# Patient Record
Sex: FEMALE | Employment: FULL TIME | URBAN - NONMETROPOLITAN AREA
[De-identification: names, ages, dates, MRNs, and addresses within clinical notes are randomized per-mention and may not be internally consistent; named-entity substitution may affect disease eponyms.]

---

## 2025-02-07 PROBLEM — M79.89 LEG SWELLING: Status: ACTIVE | Noted: 2025-02-07

## 2025-02-07 PROBLEM — R87.610 ATYPICAL SQUAMOUS CELL OF UNDETERMINED SIGNIFICANCE OF CERVIX: Status: ACTIVE | Noted: 2025-02-07

## 2025-02-07 PROBLEM — K64.8 BLEEDING INTERNAL HEMORRHOIDS: Status: ACTIVE | Noted: 2025-02-07

## 2025-02-07 PROBLEM — A49.1 INFECTION DUE TO STREPTOCOCCUS AGALACTIAE: Status: ACTIVE | Noted: 2025-02-07

## 2025-02-07 PROBLEM — A59.00 UROGENITAL INFECTION BY TRICHOMONAS VAGINALIS: Status: ACTIVE | Noted: 2025-02-07

## 2025-02-07 PROBLEM — Z98.84 BARIATRIC SURGERY STATUS: Status: ACTIVE | Noted: 2025-02-07

## 2025-02-07 PROBLEM — J45.20 MILD INTERMITTENT ASTHMA WITHOUT COMPLICATION (HHS-HCC): Status: ACTIVE | Noted: 2025-02-07

## 2025-02-07 PROBLEM — O09.30 LATE PRENATAL CARE (HHS-HCC): Status: ACTIVE | Noted: 2025-02-07

## 2025-02-07 PROBLEM — D64.9 ANEMIA: Status: ACTIVE | Noted: 2025-02-07

## 2025-02-07 PROBLEM — Z86.19 HISTORY OF SEXUALLY TRANSMITTED DISEASE: Status: ACTIVE | Noted: 2025-02-07

## 2025-02-07 PROBLEM — K59.00 CONSTIPATION: Status: ACTIVE | Noted: 2025-02-07

## 2025-02-07 PROBLEM — G47.33 OBSTRUCTIVE SLEEP APNEA, ADULT: Status: ACTIVE | Noted: 2025-02-07

## 2025-02-07 PROBLEM — K62.5 RECTAL BLEEDING: Status: ACTIVE | Noted: 2025-02-07

## 2025-02-10 ENCOUNTER — APPOINTMENT (OUTPATIENT)
Dept: PRIMARY CARE | Facility: CLINIC | Age: 40
End: 2025-02-10
Payer: COMMERCIAL

## 2025-02-12 ENCOUNTER — OFFICE VISIT (OUTPATIENT)
Dept: PRIMARY CARE | Facility: CLINIC | Age: 40
End: 2025-02-12
Payer: COMMERCIAL

## 2025-02-12 VITALS
DIASTOLIC BLOOD PRESSURE: 101 MMHG | HEART RATE: 106 BPM | SYSTOLIC BLOOD PRESSURE: 152 MMHG | WEIGHT: 293 LBS | BODY MASS INDEX: 55.32 KG/M2 | HEIGHT: 61 IN | OXYGEN SATURATION: 90 %

## 2025-02-12 DIAGNOSIS — E66.01 MORBID OBESITY WITH BMI OF 60.0-69.9, ADULT (MULTI): ICD-10-CM

## 2025-02-12 DIAGNOSIS — Z13.21 ENCOUNTER FOR VITAMIN DEFICIENCY SCREENING: ICD-10-CM

## 2025-02-12 DIAGNOSIS — M79.89 LEG SWELLING: ICD-10-CM

## 2025-02-12 DIAGNOSIS — Z13.0 SCREENING FOR BLOOD DISEASE: ICD-10-CM

## 2025-02-12 DIAGNOSIS — I15.9 SECONDARY HYPERTENSION: ICD-10-CM

## 2025-02-12 DIAGNOSIS — R63.5 WEIGHT GAIN: Primary | ICD-10-CM

## 2025-02-12 DIAGNOSIS — Z13.220 SCREENING FOR LIPID DISORDERS: ICD-10-CM

## 2025-02-12 DIAGNOSIS — Z13.1 SCREENING FOR DIABETES MELLITUS: ICD-10-CM

## 2025-02-12 DIAGNOSIS — Z13.29 SCREENING FOR THYROID DISORDER: ICD-10-CM

## 2025-02-12 DIAGNOSIS — G47.33 OBSTRUCTIVE SLEEP APNEA, ADULT: ICD-10-CM

## 2025-02-12 DIAGNOSIS — Z13.228 SCREENING FOR METABOLIC DISORDER: ICD-10-CM

## 2025-02-12 DIAGNOSIS — J45.909 MODERATE ASTHMA, UNSPECIFIED WHETHER COMPLICATED, UNSPECIFIED WHETHER PERSISTENT (HHS-HCC): ICD-10-CM

## 2025-02-12 DIAGNOSIS — Z13.89 SCREENING FOR BLOOD OR PROTEIN IN URINE: ICD-10-CM

## 2025-02-12 DIAGNOSIS — J45.20 MILD INTERMITTENT ASTHMA WITHOUT COMPLICATION (HHS-HCC): ICD-10-CM

## 2025-02-12 PROCEDURE — 3008F BODY MASS INDEX DOCD: CPT

## 2025-02-12 PROCEDURE — 1036F TOBACCO NON-USER: CPT

## 2025-02-12 PROCEDURE — 99204 OFFICE O/P NEW MOD 45 MIN: CPT

## 2025-02-12 PROCEDURE — 3077F SYST BP >= 140 MM HG: CPT

## 2025-02-12 PROCEDURE — 3080F DIAST BP >= 90 MM HG: CPT

## 2025-02-12 RX ORDER — ALBUTEROL SULFATE 90 UG/1
2 INHALANT RESPIRATORY (INHALATION) EVERY 4 HOURS PRN
Qty: 8.5 G | Refills: 0 | Status: SHIPPED | OUTPATIENT
Start: 2025-02-12 | End: 2026-02-12

## 2025-02-12 RX ORDER — HYDROCHLOROTHIAZIDE 12.5 MG/1
12.5 TABLET ORAL DAILY
Qty: 30 TABLET | Refills: 0 | Status: SHIPPED | OUTPATIENT
Start: 2025-02-12 | End: 2025-03-14

## 2025-02-12 RX ORDER — BUDESONIDE AND FORMOTEROL FUMARATE DIHYDRATE 160; 4.5 UG/1; UG/1
2 AEROSOL RESPIRATORY (INHALATION)
Qty: 10.2 G | Refills: 11 | Status: SHIPPED | OUTPATIENT
Start: 2025-02-12 | End: 2026-02-12

## 2025-02-12 ASSESSMENT — ENCOUNTER SYMPTOMS
FATIGUE: 1
POLYPHAGIA: 0
POLYDIPSIA: 0
CHEST TIGHTNESS: 1
WEAKNESS: 1
UNEXPECTED WEIGHT CHANGE: 1
BACK PAIN: 1
WHEEZING: 1
ARTHRALGIAS: 1

## 2025-02-12 ASSESSMENT — PAIN SCALES - GENERAL: PAINLEVEL_OUTOF10: 10-WORST PAIN EVER

## 2025-02-12 ASSESSMENT — PATIENT HEALTH QUESTIONNAIRE - PHQ9
SUM OF ALL RESPONSES TO PHQ9 QUESTIONS 1 AND 2: 0
2. FEELING DOWN, DEPRESSED OR HOPELESS: NOT AT ALL
1. LITTLE INTEREST OR PLEASURE IN DOING THINGS: NOT AT ALL

## 2025-02-12 NOTE — ASSESSMENT & PLAN NOTE
-Continue increasing water  -Start cuting out fast food and incorporate protein and vegetables  -Start a food journal  -Will discuss weight loss options at follow up with labs.   -Ready to start making lifestyle changes

## 2025-02-12 NOTE — PROGRESS NOTES
"Subjective     Patient ID: Leann Petersen \"Charmaine\" is a 39 y.o. female who presents for Weight Loss.    HPI    Charmaine is here to establish care. She is tearful upon entering the room.     Weight gain/Obesity BMI 68.7- Eats once or twice a day. She started food modifications about 2 weeks ago. She was drinking a lot of soda, she is not drinking water. She works at [employer] and [employer] and this is usually where she gets her meals from.  She has 5 children, her  baseline was 160 lb.     ADÁN-Can't sleep, wakes up with headaches, nods off during the day. She has issues with her c-pap feels like she can't breath with it on. SOB while talking, SpO2 noted to be 90%    Hx of asthma- she is not using an inhaler, she is short of breath with talking, she states she gets short of breath when doing small task, she gets chest pain and tightness like someone is squeezing her.     Low back pain- Has severe low back pain in the morning, has a hard time being on her feet all day.     Flat arches- Following with podiatry, they want to reconstruct feet.     IUD- Mirana, she is not sexually active at this time.     Review of Systems   Constitutional:  Positive for fatigue and unexpected weight change.   Respiratory:  Positive for chest tightness and wheezing.    Cardiovascular:  Positive for leg swelling.   Endocrine: Negative for polydipsia, polyphagia and polyuria.   Musculoskeletal:  Positive for arthralgias, back pain and gait problem.   Neurological:  Positive for weakness.       Social history:    reports that she has never smoked. She has never used smokeless tobacco. She reports that she does not drink alcohol and does not use drugs.    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    Pes valgus of left foot    Other congenital valgus deformities of feet " "2017    Pes valgus of right foot    Other specified health status     Last menstrual period (LMP) > 10 days ago    Pain in left knee 2018    Left knee pain    Personal history of other diseases of urinary system     Personal history of prenatal hydronephrosis    Personal history of other infectious and parasitic diseases     History of gonorrhea    Personal history of other specified conditions     History of abnormal Pap smear    Streptococcal infection, unspecified site     GBS (group B streptococcus) infection    Supervision of other high risk pregnancies, unspecified trimester ([facility])     Short interval between pregnancies complicating pregnancy, antepartum    Supervision of pregnancy with insufficient  care, unspecified trimester     Late prenatal care    Type A blood, Rh positive     Blood type A+    Urogenital trichomoniasis, unspecified     Trichs - trichomonas vaginalis infection      SurgHx:   History reviewed. No pertinent surgical history.     Fhx:   Mother- HTN, DM2  Maternal grandmother-HTN, DM2    Medications:   none    Allergies:   No Known Allergies     Review of systems completed and unremarkable other than what is documented in HPI.    Objective   BP (!) 152/101 (BP Location: Right arm)   Pulse 106   Ht 1.549 m (5' 1\")   Wt (!) 165 kg (363 lb 11.2 oz)   SpO2 90%   BMI 68.72 kg/m²     Physical Exam  Vitals reviewed.   Constitutional:       Appearance: She is obese.   HENT:      Head: Normocephalic.      Right Ear: Tympanic membrane, ear canal and external ear normal.      Left Ear: Tympanic membrane, ear canal and external ear normal.      Nose: Nose normal.      Mouth/Throat:      Mouth: Mucous membranes are moist.   Eyes:      Conjunctiva/sclera: Conjunctivae normal.      Pupils: Pupils are equal, round, and reactive to light.   Cardiovascular:      Rate and Rhythm: Normal rate and regular rhythm.      Pulses: Normal pulses.      Heart sounds: Normal heart sounds. "   Pulmonary:      Effort: Pulmonary effort is normal.      Breath sounds: Wheezing present.   Abdominal:      General: Bowel sounds are normal.   Musculoskeletal:         General: Swelling present.      Cervical back: Normal range of motion.      Right lower le+ Pitting Edema present.      Left lower le+ Pitting Edema present.   Skin:     General: Skin is warm.      Capillary Refill: Capillary refill takes more than 3 seconds.   Neurological:      Mental Status: She is alert and oriented to person, place, and time.   Psychiatric:         Mood and Affect: Mood normal.         Behavior: Behavior normal.         Assessment & Plan  Screening for diabetes mellitus  BMI 68.7  Orders:    Hemoglobin A1c; Future    Encounter for vitamin deficiency screening  Orders:    Vitamin D 25-Hydroxy,Total (for eval of Vitamin D levels); Future    Vitamin B12; Future    Screening for blood or protein in urine  BMI 68.7  Orders:    Urinalysis with Reflex Microscopic; Future    Screening for lipid disorders  BMI 68.7  Orders:    Lipid panel; Future    Screening for thyroid disorder  Hx of abnormal TSH levels  Orders:    Tsh With Reflex To Free T4 If Abnormal; Future    T3; Future    Thyroid Peroxidase (TPO) Antibody; Future    Screening for metabolic disorder  Orders:    Comprehensive metabolic panel; Future    Screening for blood disease  Orders:    CBC and Auto Differential; Future    Weight gain  Orders:    T3; Future    Thyroid Peroxidase (TPO) Antibody; Future  Expresses wanting to lose weight  Obstructive sleep apnea, adult  Wear CPAP  Referral back to sleep medicine to help adjust CPAP  Weight loss    Secondary hypertension  BLE edema  -Start hydrochlorothiazide  -Follow up 6 weeks for bp check    Moderate asthma, unspecified whether complicated, unspecified whether persistent ([facility])  Start albuterol and symbicort  Discussed weight loss    Morbid obesity with BMI of 60.0-69.9, adult (Multi)  -Continue increasing  water  -Start cuting out fast food and incorporate protein and vegetables  -Start a food journal  -Will discuss weight loss options at follow up with labs.   -Ready to start making lifestyle changes         #HCM  DM, Thyroid, Lipid Screening- ordered  Pap- 2019 due  Follow up 6 weeks for BP and weight check

## 2025-02-13 DIAGNOSIS — E55.9 VITAMIN D DEFICIENCY: Primary | ICD-10-CM

## 2025-02-13 LAB
25(OH)D3+25(OH)D2 SERPL-MCNC: 9 NG/ML (ref 30–100)
ALBUMIN SERPL-MCNC: 4 G/DL (ref 3.6–5.1)
ALP SERPL-CCNC: 74 U/L (ref 31–125)
ALT SERPL-CCNC: 21 U/L (ref 6–29)
ANION GAP SERPL CALCULATED.4IONS-SCNC: 6 MMOL/L (CALC) (ref 7–17)
AST SERPL-CCNC: 13 U/L (ref 10–30)
BASOPHILS # BLD AUTO: 22 CELLS/UL (ref 0–200)
BASOPHILS NFR BLD AUTO: 0.4 %
BILIRUB SERPL-MCNC: 0.4 MG/DL (ref 0.2–1.2)
BUN SERPL-MCNC: 10 MG/DL (ref 7–25)
CALCIUM SERPL-MCNC: 9.1 MG/DL (ref 8.6–10.2)
CHLORIDE SERPL-SCNC: 99 MMOL/L (ref 98–110)
CHOLEST SERPL-MCNC: 138 MG/DL
CHOLEST/HDLC SERPL: 2.3 (CALC)
CO2 SERPL-SCNC: 36 MMOL/L (ref 20–32)
CREAT SERPL-MCNC: 0.55 MG/DL (ref 0.5–0.97)
EGFRCR SERPLBLD CKD-EPI 2021: 121 ML/MIN/1.73M2
EOSINOPHIL # BLD AUTO: 28 CELLS/UL (ref 15–500)
EOSINOPHIL NFR BLD AUTO: 0.5 %
ERYTHROCYTE [DISTWIDTH] IN BLOOD BY AUTOMATED COUNT: 13.3 % (ref 11–15)
EST. AVERAGE GLUCOSE BLD GHB EST-MCNC: 134 MG/DL
EST. AVERAGE GLUCOSE BLD GHB EST-SCNC: 7.4 MMOL/L
GLUCOSE SERPL-MCNC: 107 MG/DL (ref 65–99)
HBA1C MFR BLD: 6.3 % OF TOTAL HGB
HCT VFR BLD AUTO: 45.5 % (ref 35–45)
HDLC SERPL-MCNC: 61 MG/DL
HGB BLD-MCNC: 14.2 G/DL (ref 11.7–15.5)
LDLC SERPL CALC-MCNC: 64 MG/DL (CALC)
LYMPHOCYTES # BLD AUTO: 2145 CELLS/UL (ref 850–3900)
LYMPHOCYTES NFR BLD AUTO: 38.3 %
MCH RBC QN AUTO: 29.2 PG (ref 27–33)
MCHC RBC AUTO-ENTMCNC: 31.2 G/DL (ref 32–36)
MCV RBC AUTO: 93.6 FL (ref 80–100)
MONOCYTES # BLD AUTO: 448 CELLS/UL (ref 200–950)
MONOCYTES NFR BLD AUTO: 8 %
NEUTROPHILS # BLD AUTO: 2957 CELLS/UL (ref 1500–7800)
NEUTROPHILS NFR BLD AUTO: 52.8 %
NONHDLC SERPL-MCNC: 77 MG/DL (CALC)
PLATELET # BLD AUTO: 235 THOUSAND/UL (ref 140–400)
PMV BLD REES-ECKER: 12.2 FL (ref 7.5–12.5)
POTASSIUM SERPL-SCNC: 4.6 MMOL/L (ref 3.5–5.3)
PROT SERPL-MCNC: 7 G/DL (ref 6.1–8.1)
RBC # BLD AUTO: 4.86 MILLION/UL (ref 3.8–5.1)
SODIUM SERPL-SCNC: 141 MMOL/L (ref 135–146)
T3 SERPL-MCNC: 123 NG/DL (ref 76–181)
THYROPEROXIDASE AB SERPL-ACNC: <1 IU/ML
TRIGL SERPL-MCNC: 45 MG/DL
TSH SERPL-ACNC: 0.41 MIU/L
VIT B12 SERPL-MCNC: 404 PG/ML (ref 200–1100)
WBC # BLD AUTO: 5.6 THOUSAND/UL (ref 3.8–10.8)

## 2025-02-13 RX ORDER — ERGOCALCIFEROL 1.25 MG/1
50000 CAPSULE ORAL WEEKLY
Qty: 12 CAPSULE | Refills: 0 | Status: SHIPPED | OUTPATIENT
Start: 2025-02-13 | End: 2025-05-08

## 2025-02-13 NOTE — RESULT ENCOUNTER NOTE
Please call the patient regarding her abnormal result. Sent vitamin D to pharm [phone] once weekly

## 2025-02-20 ENCOUNTER — HOSPITAL ENCOUNTER (OUTPATIENT)
Dept: RADIOLOGY | Facility: CLINIC | Age: 40
Discharge: HOME | End: 2025-02-20
Payer: COMMERCIAL

## 2025-02-20 ENCOUNTER — APPOINTMENT (OUTPATIENT)
Dept: ORTHOPEDIC SURGERY | Facility: CLINIC | Age: 40
End: 2025-02-20
Payer: COMMERCIAL

## 2025-02-20 DIAGNOSIS — M25.561 PAIN IN BOTH KNEES, UNSPECIFIED CHRONICITY: ICD-10-CM

## 2025-02-20 DIAGNOSIS — M25.562 PAIN IN BOTH KNEES, UNSPECIFIED CHRONICITY: ICD-10-CM

## 2025-02-20 PROCEDURE — 99204 OFFICE O/P NEW MOD 45 MIN: CPT | Performed by: ORTHOPAEDIC SURGERY

## 2025-02-20 PROCEDURE — 73564 X-RAY EXAM KNEE 4 OR MORE: CPT | Mod: 50

## 2025-02-20 PROCEDURE — 1036F TOBACCO NON-USER: CPT | Performed by: ORTHOPAEDIC SURGERY

## 2025-02-20 RX ORDER — MELOXICAM 15 MG/1
15 TABLET ORAL DAILY
Qty: 60 TABLET | Refills: 0 | Status: SHIPPED | OUTPATIENT
Start: 2025-02-20 | End: 2025-04-21

## 2025-02-20 ASSESSMENT — PAIN SCALES - GENERAL: PAINLEVEL_OUTOF10: 10 - WORST POSSIBLE PAIN

## 2025-02-20 ASSESSMENT — PAIN - FUNCTIONAL ASSESSMENT: PAIN_FUNCTIONAL_ASSESSMENT: 0-10

## 2025-02-20 NOTE — LETTER
February 20, 2025     TONI Medrano  [address]  [place] [address]    Patient: Charmaine Petersen   YOB: 1985   Date of Visit: 2/20/2025       Dear TONI Jorgensen:    Thank you for referring Charmaine Petersen to me for evaluation. Below are my notes for this consultation.  If you have questions, please do not hesitate to call me. I look forward to following your patient along with you.       Sincerely,     Sarath Jefferson MD      CC: No Recipients  ______________________________________________________________________________________    This is a consultation from TONI Jorgensen for   Chief Complaint   Patient presents with   • Left Knee - Pain   • Right Knee - Pain       This is a 39 y.o. female who presents for evaluation of bilateral knee pain.  Patient states she has had issues with her bilateral knees for years, this is a longstanding chronic issue for her.  It has been exacerbated recently.  She complains of a sharp stabbing pain over the medial and anterior knee worse with any type of walking.  She does not really take medications.  She has tried cortisone shots in the past that have not helped very much.  She has never had surgery on either knee.  No numbness no tingling no pain down the leg.    Physical Exam    There has been no interval change in this patient's past medical, surgical, medications, allergies, family history or social history since the most recent visit to a provider within our department. 14 point review of systems was performed, reviewed, and negative except for pertinent positives documented in the history of present illness.     Constitutional: well developed, well nourished female in no acute distress  Psychiatric: normal mood, appropriate affect  Eyes: sclera anicteric  HENT: normocephalic/atraumatic  CV: regular rate and rhythm   Respiratory: non labored breathing  Integumentary: no rash  Neurological: moves all  extremities    Bilateral knee exam: skin intact no lacerations or abrations. no effusion.  Tender medial joint line. negative log roll negative patellar grind. ROM 0-120. stable to varus and valgus stress at 0 and 30 degrees. negative lachman negative posterior drawer negative everardo. 5/5 ehl/fhl/gs/ta. silt s/s/sp/dp/t. 2+ dp/pt        Xrays were ordered by me, they were reviewed and independently interpreted by me today, they show moderate degenerative changes bilateral knees    Procedures      Impression/Plan: This is a 39 y.o. female with moderate bilateral knee arthritis.  I had an in depth discussion with the patient regarding treatment options for arthritis and their relative risks and benefits. We reviewed surgical and nonsurgical option for treatment. Treatments include anti inflammatory medications, physical therapy, weight loss, activity modification, use of assistive devices, injection therapies. We discussed current prescriptions and risks and benefits of continuation of prescription medication as apporpriate. We discussed that arthritis is often progressive over time, an in end stage arthritis surgical interventions can be considered, including arthroplasty. All questions were answered and the patient voiced their understanding.  Will get her set up with an anti-inflammatory physical therapy.  We discussed the possibility of gel injections, we will consider that in the future.  We discussed we discussed weight loss, she is working on that.  I will see her back to continue caring for her arthritis.    BMI Readings from Last 1 Encounters:   02/12/25 68.72 kg/m²      Lab Results   Component Value Date    CREATININE 0.55 02/12/2025     Tobacco Use: Low Risk  (2/20/2025)    Patient History    • Smoking Tobacco Use: Never    • Smokeless Tobacco Use: Never    • Passive Exposure: Not on file      Computed MELD 3.0 unavailable. One or more values for this score either were not found within the given timeframe  "or did not fit some other criterion.  Computed MELD-Na unavailable. One or more values for this score either were not found within the given timeframe or did not fit some other criterion.       Lab Results   Component Value Date    HGBA1C 6.3 (H) 02/12/2025     No results found for: \"STAPHMRSASCR\"  "

## 2025-02-20 NOTE — PROGRESS NOTES
This is a consultation from ZEYAD Jorgensen-CNP for   Chief Complaint   Patient presents with    Left Knee - Pain    Right Knee - Pain       This is a 39 y.o. female who presents for evaluation of bilateral knee pain.  Patient states she has had issues with her bilateral knees for years, this is a longstanding chronic issue for her.  It has been exacerbated recently.  She complains of a sharp stabbing pain over the medial and anterior knee worse with any type of walking.  She does not really take medications.  She has tried cortisone shots in the past that have not helped very much.  She has never had surgery on either knee.  No numbness no tingling no pain down the leg.    Physical Exam    There has been no interval change in this patient's past medical, surgical, medications, allergies, family history or social history since the most recent visit to a provider within our department. 14 point review of systems was performed, reviewed, and negative except for pertinent positives documented in the history of present illness.     Constitutional: well developed, well nourished female in no acute distress  Psychiatric: normal mood, appropriate affect  Eyes: sclera anicteric  HENT: normocephalic/atraumatic  CV: regular rate and rhythm   Respiratory: non labored breathing  Integumentary: no rash  Neurological: moves all extremities    Bilateral knee exam: skin intact no lacerations or abrations. no effusion.  Tender medial joint line. negative log roll negative patellar grind. ROM 0-120. stable to varus and valgus stress at 0 and 30 degrees. negative lachman negative posterior drawer negative everardo. 5/5 ehl/fhl/gs/ta. silt s/s/sp/dp/t. 2+ dp/pt        Xrays were ordered by me, they were reviewed and independently interpreted by me today, they show moderate degenerative changes bilateral knees    Procedures      Impression/Plan: This is a 39 y.o. female with moderate bilateral knee arthritis.  I had an in  "depth discussion with the patient regarding treatment options for arthritis and their relative risks and benefits. We reviewed surgical and nonsurgical option for treatment. Treatments include anti inflammatory medications, physical therapy, weight loss, activity modification, use of assistive devices, injection therapies. We discussed current prescriptions and risks and benefits of continuation of prescription medication as apporpriate. We discussed that arthritis is often progressive over time, an in end stage arthritis surgical interventions can be considered, including arthroplasty. All questions were answered and the patient voiced their understanding.  Will get her set up with an anti-inflammatory physical therapy.  We discussed the possibility of gel injections, we will consider that in the future.  We discussed we discussed weight loss, she is working on that.  I will see her back to continue caring for her arthritis.    BMI Readings from Last 1 Encounters:   02/12/25 68.72 kg/m²      Lab Results   Component Value Date    CREATININE 0.55 02/12/2025     Tobacco Use: Low Risk  (2/20/2025)    Patient History     Smoking Tobacco Use: Never     Smokeless Tobacco Use: Never     Passive Exposure: Not on file      Computed MELD 3.0 unavailable. One or more values for this score either were not found within the given timeframe or did not fit some other criterion.  Computed MELD-Na unavailable. One or more values for this score either were not found within the given timeframe or did not fit some other criterion.       Lab Results   Component Value Date    HGBA1C 6.3 (H) 02/12/2025     No results found for: \"STAPHMRSASCR\"  "

## 2025-03-10 ENCOUNTER — DOCUMENTATION (OUTPATIENT)
Dept: PHYSICAL THERAPY | Facility: HOSPITAL | Age: 40
End: 2025-03-10
Payer: COMMERCIAL

## 2025-03-10 ENCOUNTER — APPOINTMENT (OUTPATIENT)
Dept: PHYSICAL THERAPY | Facility: HOSPITAL | Age: 40
End: 2025-03-10
Payer: COMMERCIAL

## 2025-03-10 NOTE — PROGRESS NOTES
"Physical Therapy                 Therapy Communication Note    Patient Name: Leann Petersen \"Charmaine\"  MRN: [id number]  Today's Date: 3/10/2025     Discipline: Physical Therapy    Missed Time: Canceled today's appointment. No reason given.    "

## 2025-03-14 ENCOUNTER — DOCUMENTATION (OUTPATIENT)
Dept: PHYSICAL THERAPY | Facility: HOSPITAL | Age: 40
End: 2025-03-14
Payer: COMMERCIAL

## 2025-03-14 ENCOUNTER — APPOINTMENT (OUTPATIENT)
Dept: PHYSICAL THERAPY | Facility: HOSPITAL | Age: 40
End: 2025-03-14
Payer: COMMERCIAL

## 2025-03-14 NOTE — PROGRESS NOTES
"Physical Therapy                 Therapy Communication Note    Patient Name: Leann Petersen \"Charmaine\"  MRN: [id number]  Today's Date: 3/14/2025     Discipline: Physical Therapy    Missed Time: Cancel today's visit. Patient rescheduled for next week.    "

## 2025-03-21 ENCOUNTER — EVALUATION (OUTPATIENT)
Dept: PHYSICAL THERAPY | Facility: HOSPITAL | Age: 40
End: 2025-03-21
Payer: COMMERCIAL

## 2025-03-21 DIAGNOSIS — M25.562 PAIN IN BOTH KNEES, UNSPECIFIED CHRONICITY: Primary | ICD-10-CM

## 2025-03-21 DIAGNOSIS — M25.561 PAIN IN BOTH KNEES, UNSPECIFIED CHRONICITY: Primary | ICD-10-CM

## 2025-03-21 PROCEDURE — 97110 THERAPEUTIC EXERCISES: CPT | Mod: GP | Performed by: PHYSICAL THERAPIST

## 2025-03-21 PROCEDURE — 97162 PT EVAL MOD COMPLEX 30 MIN: CPT | Mod: GP | Performed by: PHYSICAL THERAPIST

## 2025-03-21 ASSESSMENT — PAIN - FUNCTIONAL ASSESSMENT: PAIN_FUNCTIONAL_ASSESSMENT: 0-10

## 2025-03-21 ASSESSMENT — PAIN SCALES - GENERAL: PAINLEVEL_OUTOF10: 10 - WORST POSSIBLE PAIN

## 2025-03-21 ASSESSMENT — ENCOUNTER SYMPTOMS
DEPRESSION: 0
LOSS OF SENSATION IN FEET: 0
OCCASIONAL FEELINGS OF UNSTEADINESS: 0

## 2025-03-21 NOTE — PROGRESS NOTES
"  Physical Therapy  Physical Therapy Evaluation and Treatment    Patient Name: Leann Petersen \"Jairo"  MRN: [id number]  Today's Date: 3/21/2025  Time Calculation  Start Time: 0936  Stop Time: 1015  Time Calculation (min): 39 min    Today's Charges  PT Evaluation Time Entry  PT Evaluation (Moderate) Time Entry: 29  PT Therapeutic Procedures Time Entry  Therapeutic Exercise Time Entry: 10     Insurance:  Visit number: 1 of 5  Visit Limit: MN  Co-Pay: $0  Authorization info: no auth required  Payor: [employer] / Plan: [employer] / Product Type: *No Product type* /     Current Problem  1. Pain in both knees, unspecified chronicity  Referral to Physical Therapy    Follow Up In Physical Therapy        Problem List Items Addressed This Visit             ICD-10-CM    Pain in both knees - Primary M25.561, M25.562    Relevant Orders    Follow Up In Physical Therapy       General:  General  Reason for Referral: bilat knee pain  Referred By: Dr. Jefferson    Precautions:   Precautions  STEADI Fall Risk Score (The score of 4 or more indicates an increased risk of falling): 3  Medical Precautions: No known precautions/limitation    Medical History Form: Reviewed (scanned into chart)    Subjective:   Subjective   Chief Complaint: Patient is a 39 year old female who presents to clinic with complaints of bilateral knee pain. Patient has had issues with bilateral knee pain for 2-3 years and her pain has become more constant over the past year. Patient has a prior medical history including: morbid obesity, anemia, flat feet, peroneal tendon injury, ankle sprain, bilateral knee pain, moderate asthma. Patient states that she has shoe inserts for her flat feet, but she does not wear them.  Onset Date: 2/20/2025  SWATHI: Chronic    Current Condition:   Same    Pain:  Pain Assessment: 0-10  0-10 (Numeric) Pain Score: 10 - Worst possible pain  Pain Type: Chronic pain  Pain Location: Knee  Pain Orientation: Right, Left  Highest: \"20\"/10 " pain  Lowest: 5/10 pain  Aggravating Factors:  Standing and Walking  Relieving Factors:  Rest and Sitting    Relevant Information (PMH & Previous Tests/Imaging):   Bilateral knee xrays 2/20/2025:  IMPRESSION:  Advanced osteoarthritis left knee worst medially.  Mild-to-moderate osteoarthritis right knee worst medially.    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 No  Fever/chills, unexplained weight changes, dizziness/fainting, unexplained change in bowel or bladder functions, unexplained malaise or muscle weakness, night pain/sweats, numbness or tingling    Objective:  Objective     Hip PROM  R hip flexion: (125°): mod impairment  L hip flexion: (125°): mod impairment  R hip abduction: (45°): WFL  L hip abduction: (45°): WFL  R hip ER: (45°): WFL  L hip ER: (45°): WFL  R hip IR: (45°): min impairment  L hip IR: (45°): mod impairment    Specific Lower Extremity MMT  R Iliopsoas: (5/5): at least 3+/5  L Iliopsoas: (5/5): at least 3+/5  R Gluteals (sidelying): (5/5): functionally observed at least 3/5  L Gluteals (sidelying): (5/5): functionally observed at least 3/5    Flexibility  R hamstrings: min impaired  L hamstrings: min impaired    Functional Rating Scale  LEFS /80: 5    Knee AROM  R knee flexion: (140°): 0-95  L knee flexion: (140°): 0-10-70    Knee MMT  R knee flexion: (5/5): 4+/5  L knee flexion: (5/5): 4+/5  R knee extension: (5/5): 4/5 (pain)  L knee extension: (5/5): 4/5 (pain)        Functional Screening    Posture: flexed posture    Lower Extremity Functional Movements  Transfers: Independent  Gait:   antalgic, Trendelenburg pattern  Assistive Device: no device  DL Squat: requires use of bilateral upper extremities to assist  SL Squat: not tested   Balance  Not tested     Palpation: point tenderness to palpation of medial joint line of left knee    Joint Mobility: hypomobile    Flexibility: impaired left hip and hamstring flexibility      Special Tests    Ligament Tests:              Anterior Drawer Test: -   Posterior Drawer Test: -   Valgus Stress Test: -   Varus Stress Test: -  Meniscus   Nando Test: -     Patella   Grind Test: -    Outcome Measures:  Other Measures  Lower Extremity Funtional Score (LEFS): 5/80     Treatment Performed:  Therapeutic Exercise  Therapeutic Exercise Performed: Yes  Therapeutic Exercise Activity 1: supine heel slides arom x10  Therapeutic Exercise Activity 2: QS with towel roll under knee x5  Therapeutic Exercise Activity 3: GS x5  Therapeutic Exercise Activity 4: supine hip abd x5              EDUCATION:   Individual(s) Educated: patient   Education Provided: Home exercise program, plan of care, activity modifications, pain management, and injury pathology  Handout(s) Provided: Scanned into chart  Home Program: Access Code: [email or web address]  URL: [email or web address]  Date: 03/21/2025  Prepared by: Jr Frost    Exercises  - Seated Heel Slide  - 1-2 x daily - 10 reps  - Supine Quad Set on Towel Roll  - 1-2 x daily - 10 reps  - Supine Gluteal Sets  - 1-2 x daily - 10 reps  - Supine Hip Abduction  - 1-2 x daily - 10 reps  Risk and Benefits Discussed with Patient/Caregiver/Other: Yes   Patient/Caregiver Demonstrated Understanding: Yes   Plan of Care Discussed and Agreed Upon: Yes   Patient Response to Education: Patient/Caregiver verbalized understanding of information, Patient/Caregiver performed return demonstration of exercises/activities, and Patient/Caregiver asked appropriate questions    Assessment: Patient presents with impaired strength, range of  motion/flexibility, gait, and mobility resulting in limited participation in pain-free ADLs and inability to perform at their prior level of function. Patient demonstrated difficulty fully extending her left knee and demonstrated a tendency to keep her left hip in external rotation to decrease her left knee pain. Patient demonstrated pain with knee range of motion and quad sets requiring a larger towel roll under her knee for comfort. Patient demonstrated difficulty performing quad set with verbal cues and tactile cueing. Skilled PT warranted to address the above stated impairments, so the patient can perform FA's without increased pain or difficulty.    PT Assessment Results: Decreased strength, Decreased range of motion, Decreased mobility, Obesity, Pain  Rehab Prognosis: Fair  Evaluation/Treatment Tolerance: Patient limited by pain, Patient tolerated treatment well    Complexity: moderate    Plan:  Treatment/Interventions: Education/ Instruction, Gait training, Manual therapy, Neuromuscular re-education, Therapeutic activities, Therapeutic exercises  PT Plan: Skilled PT  PT Frequency: 1 time per week  Duration: 5 weeks  Onset Date: 02/20/25  Certification Period Start Date: 03/21/25  Certification Period End Date: 04/25/25  Number of Treatments Authorized: 1 of 5 (30/yr)  Rehab Potential: Fair  Plan of Care Agreement: Patient    Goals: Set and discussed today  Active       PT Problem       Patient will work a full shift without pain.        Start:  03/21/25    Expected End:  04/25/25            Patient will ambulate household distance using no device with independent without increased knee pain.       Start:  03/21/25    Expected End:  04/11/25            Patient will ambulate up and down a curb/single step with modified independent using rail.       Start:  03/21/25    Expected End:  04/25/25            Patient will achieve bilateral knee ROM of  0-110 degrees        Start:  03/21/25    Expected End:  04/25/25             Patient will achieve bilateral hip flexion strength of at least 4+/5       Start:  03/21/25    Expected End:  04/25/25            Patient will achieve bilateral hip abduction strength of at least 4+/5       Start:  03/21/25    Expected End:  04/25/25            Patient will achieve bilateral knee flexion strength of at least 4+/5       Start:  03/21/25    Expected End:  04/25/25            Patient will achieve bilateral knee extension strength of at least 4+/5       Start:  03/21/25    Expected End:  04/25/25            Patient will demonstrate independence in home program for support of progression       Start:  03/21/25    Expected End:  04/25/25            Patient will report pain of no more than 2/10 demonstrating a reduction of overall pain       Start:  03/21/25    Expected End:  04/25/25            Patient will show a significant change in LEFS (5 to 14) patient reported outcome tool to demonstrate subjective imporovement       Start:  03/21/25    Expected End:  04/25/25                Plan of care was developed with input and agreement by the patient    Ambulatory Screenings Summary       Screening  Frequency  Date Last Completed   Spiritual and Cultural Beliefs   Screening each visit or episode of care 3/21/2025   Falls Risk Screening every ambulatory visit 3/21/2025  9:53 AM   Pain Screening annually at primary care visit  2/12/2025   Domestic Violence screening annually at primary care visit 3/21/2025   Depression Screening annually in the primary care setting 2/12/2025   Suicide Risk Screening annually in the primary care setting    Nutrition and Food Insecurity   Screening at least annually at primary care visit     Key Learner annually in the primary care setting 3/21/2025   Drug Screen  2/20/2025 10:22 AM   Alcohol Screen  2/20/2025 10:22 AM   Advance Directive         Jr Frost, PT

## 2025-03-24 DIAGNOSIS — I15.9 SECONDARY HYPERTENSION: ICD-10-CM

## 2025-03-24 DIAGNOSIS — M79.89 LEG SWELLING: ICD-10-CM

## 2025-03-24 RX ORDER — HYDROCHLOROTHIAZIDE 12.5 MG/1
TABLET ORAL
Qty: 30 TABLET | Refills: 0 | Status: SHIPPED | OUTPATIENT
Start: 2025-03-24

## 2025-03-31 ENCOUNTER — TREATMENT (OUTPATIENT)
Dept: PHYSICAL THERAPY | Facility: HOSPITAL | Age: 40
End: 2025-03-31
Payer: COMMERCIAL

## 2025-03-31 DIAGNOSIS — M25.561 PAIN IN BOTH KNEES, UNSPECIFIED CHRONICITY: ICD-10-CM

## 2025-03-31 DIAGNOSIS — M25.562 PAIN IN BOTH KNEES, UNSPECIFIED CHRONICITY: ICD-10-CM

## 2025-03-31 PROCEDURE — 97110 THERAPEUTIC EXERCISES: CPT | Mod: GP,CQ

## 2025-03-31 ASSESSMENT — PAIN - FUNCTIONAL ASSESSMENT: PAIN_FUNCTIONAL_ASSESSMENT: 0-10

## 2025-03-31 ASSESSMENT — PAIN SCALES - GENERAL: PAINLEVEL_OUTOF10: 10 - WORST POSSIBLE PAIN

## 2025-03-31 NOTE — PROGRESS NOTES
"  Physical Therapy Treatment    Patient Name: Leann Petersen  MRN: [id number]  Today's Date: 3/31/2025  Time Calculation  Start Time: 1003  Stop Time: 1041  Time Calculation (min): 38 min        PT Therapeutic Procedures Time Entry  Therapeutic Exercise Time Entry: 38                Current Problem  1. Pain in both knees, unspecified chronicity  Follow Up In Physical Therapy          General  Reason for Referral: bilat knee pain  Referred By: Dr. Jefferson    Subjective   Current Condition:   Same  Patient reports she hasn't been taking the Meloxicam, as she didn't notice any improvement when she took them.     Performing HEP?: Yes    Precautions  Precautions  Medical Precautions: No known precautions/limitation  Pain  Pain Assessment: 0-10  0-10 (Numeric) Pain Score: 10 - Worst possible pain  Pain Location: Knee  Pain Orientation: Right, Left    Objective      Knee AROM       Treatments:    Therapeutic Exercise  Therapeutic Exercise Performed: Yes  Therapeutic Exercise Activity 1: supine heel slides arom x10  Therapeutic Exercise Activity 2: QS with towel roll under knee x5  Therapeutic Exercise Activity 3: LAQ x10  Therapeutic Exercise Activity 4: supine hip abd x15  Therapeutic Exercise Activity 5: SAQ x10  Therapeutic Exercise Activity 6: Iso Hip ADD x10 3\"  Therapeutic Exercise Activity 7: Ankle PF Blue x20  Therapeutic Exercise Activity 8: Hamstring Red x20 R/L  Therapeutic Exercise Activity 9: Standing march x 8-10 R              EDUCATION:   Individual(s) Educated: Patient  Education Provided: HEP   Risk and Benefits Discussed with Patient/Caregiver/Other: Yes   Patient/Caregiver Demonstrated Understanding: Yes   Patient Response to Education: Patient/Caregiver verbalized understanding of information    Assessment: Patient was able to perform OKC strengthening without c/o increased pain. Patient not able to perform standing march with the R d/t increased pain WB on the L, so held for today.   PT Assessment  PT " Assessment Results: Decreased strength, Decreased range of motion, Decreased mobility, Obesity, Pain  Rehab Prognosis: Fair    Plan: Continue to progress current POC as tolerated to facilitate ability to perform functional activities.   OP PT Plan  Treatment/Interventions: Education/ Instruction, Gait training, Manual therapy, Neuromuscular re-education, Therapeutic activities, Therapeutic exercises  PT Plan: Skilled PT  PT Frequency: 1 time per week  Duration: 5 weeks  Onset Date: 02/20/25  Certification Period Start Date: 03/21/25  Certification Period End Date: 04/25/25  Number of Treatments Authorized: 2 of 5 (30/yr)  Rehab Potential: Fair  Plan of Care Agreement: Patient    Goals:  Active       PT Problem       Patient will work a full shift without pain.        Start:  03/21/25    Expected End:  04/25/25            Patient will ambulate household distance using no device with independent without increased knee pain.       Start:  03/21/25    Expected End:  04/11/25            Patient will ambulate up and down a curb/single step with modified independent using rail.       Start:  03/21/25    Expected End:  04/25/25            Patient will achieve bilateral knee ROM of  0-110 degrees        Start:  03/21/25    Expected End:  04/25/25            Patient will achieve bilateral hip flexion strength of at least 4+/5       Start:  03/21/25    Expected End:  04/25/25            Patient will achieve bilateral hip abduction strength of at least 4+/5       Start:  03/21/25    Expected End:  04/25/25            Patient will achieve bilateral knee flexion strength of at least 4+/5       Start:  03/21/25    Expected End:  04/25/25            Patient will achieve bilateral knee extension strength of at least 4+/5       Start:  03/21/25    Expected End:  04/25/25            Patient will demonstrate independence in home program for support of progression       Start:  03/21/25    Expected End:  04/25/25            Patient will  report pain of no more than 2/10 demonstrating a reduction of overall pain       Start:  03/21/25    Expected End:  04/25/25            Patient will show a significant change in LEFS (5 to 14) patient reported outcome tool to demonstrate subjective imporovement       Start:  03/21/25    Expected End:  04/25/25                 Micah Crane PTA

## 2025-04-07 ENCOUNTER — DOCUMENTATION (OUTPATIENT)
Dept: PHYSICAL THERAPY | Facility: HOSPITAL | Age: 40
End: 2025-04-07
Payer: COMMERCIAL

## 2025-04-07 ENCOUNTER — APPOINTMENT (OUTPATIENT)
Dept: PHYSICAL THERAPY | Facility: HOSPITAL | Age: 40
End: 2025-04-07
Payer: COMMERCIAL

## 2025-04-07 NOTE — PROGRESS NOTES
"Physical Therapy                 Therapy Communication Note    Patient Name: Leann Petersen \"Charmaine\"  MRN: [id number]  Department:   Room: Room/bed info not found  Today's Date: 4/7/2025     Discipline: Physical Therapy          Missed Visit Reason:  cancelled via MyChart    Missed Time: Cancel 10:00      "

## 2025-04-14 ENCOUNTER — DOCUMENTATION (OUTPATIENT)
Dept: PHYSICAL THERAPY | Facility: HOSPITAL | Age: 40
End: 2025-04-14
Payer: COMMERCIAL

## 2025-04-14 ENCOUNTER — APPOINTMENT (OUTPATIENT)
Dept: PHYSICAL THERAPY | Facility: HOSPITAL | Age: 40
End: 2025-04-14
Payer: COMMERCIAL

## 2025-04-14 NOTE — PROGRESS NOTES
"Physical Therapy                 Therapy Communication Note    Patient Name: Leann Petersen \"Charmaine\"  MRN: [id number]  Today's Date: 4/14/2025     Discipline: Physical Therapy    Missed Time: Cancel, no reason given.    "

## 2025-04-21 ENCOUNTER — APPOINTMENT (OUTPATIENT)
Dept: PHYSICAL THERAPY | Facility: HOSPITAL | Age: 40
End: 2025-04-21
Payer: COMMERCIAL

## 2025-04-24 ENCOUNTER — TREATMENT (OUTPATIENT)
Dept: PHYSICAL THERAPY | Facility: HOSPITAL | Age: 40
End: 2025-04-24
Payer: COMMERCIAL

## 2025-04-24 DIAGNOSIS — M25.561 PAIN IN BOTH KNEES, UNSPECIFIED CHRONICITY: ICD-10-CM

## 2025-04-24 DIAGNOSIS — M25.562 PAIN IN BOTH KNEES, UNSPECIFIED CHRONICITY: ICD-10-CM

## 2025-04-24 PROCEDURE — 97110 THERAPEUTIC EXERCISES: CPT | Mod: GP,CQ

## 2025-04-24 ASSESSMENT — PAIN - FUNCTIONAL ASSESSMENT: PAIN_FUNCTIONAL_ASSESSMENT: 0-10

## 2025-04-24 ASSESSMENT — PAIN SCALES - GENERAL: PAINLEVEL_OUTOF10: 9

## 2025-04-24 NOTE — PROGRESS NOTES
"  Physical Therapy Treatment    Patient Name: Leann Petersen  MRN: [id number]  Today's Date: 4/24/2025  Time Calculation  Start Time: 0850 (Pt. late)  Stop Time: 0918  Time Calculation (min): 28 min        PT Therapeutic Procedures Time Entry  Therapeutic Exercise Time Entry: 28                Current Problem  1. Pain in both knees, unspecified chronicity  Follow Up In Physical Therapy          General  Reason for Referral: bilat knee pain  Referred By: Dr. Jefferson    Subjective   Current Condition:   Same  Patient reports  she continues to experience pain in B knees with > pain in L knee today    Performing HEP?: Yes    Precautions  Precautions  Medical Precautions: No known precautions/limitation    Pain  Pain Assessment: 0-10  0-10 (Numeric) Pain Score: 9  Pain Type: Chronic pain  Pain Location: Knee  Pain Orientation: Right, Left    Objective   KNEE  Observation   Pt. Is SOB with exercises , so we must take it slower and give rest breaks  Knee AROM   Limited due to pain, and soft tissue approximation  Gait   Duck walks/ no assistive device  Flexibility   Decreased Les core     Treatments:    Therapeutic Exercise  Therapeutic Exercise Performed: Yes  Therapeutic Exercise Activity 1: supine heel slides arom x10  Therapeutic Exercise Activity 2: QS with towel roll under knee x5  Therapeutic Exercise Activity 3: LAQ x15 with 1.5#  Therapeutic Exercise Activity 4: supine hip abd x15  Therapeutic Exercise Activity 5: SAQ x10  Therapeutic Exercise Activity 6: Iso Hip ADD x10 3\"  Therapeutic Exercise Activity 7: Ankle PF Blue x20  Therapeutic Exercise Activity 8: Hamstring Red x20 R/L  Therapeutic Exercise Activity 10: QS with SLR 2 x 5  Therapeutic Exercise Activity 11: STS 3 x 5    EDUCATION:   Outpatient Education  Individual(s) Educated: Patient  Education Provided: Home Exercise Program  Patient/Caregiver Demonstrated Understanding: yes    Assessment: Pt. Able to tolerate all exercises, but required rest breaks due " to pain and fatigue as well as SOB. Will advance as tolerated.  PT Assessment  PT Assessment Results: Decreased strength, Decreased range of motion, Decreased mobility, Obesity, Pain  Rehab Prognosis: Fair    Plan: continue with PT POC with focus on strengthening exercises for Les and core for better overall joint support.  OP PT Plan  Treatment/Interventions: Education/ Instruction, Gait training, Manual therapy, Neuromuscular re-education, Therapeutic activities, Therapeutic exercises  PT Plan: Skilled PT  PT Frequency: 1 time per week  Duration: 5 weeks  Onset Date: 02/20/25  Certification Period Start Date: 03/21/25  Certification Period End Date: 04/25/25  Number of Treatments Authorized: 3 of 5 (30/yr)  Rehab Potential: Fair  Plan of Care Agreement: Patient    Goals:  Active       PT Problem       Patient will work a full shift without pain.        Start:  03/21/25    Expected End:  04/25/25            Patient will ambulate household distance using no device with independent without increased knee pain.       Start:  03/21/25    Expected End:  04/11/25            Patient will ambulate up and down a curb/single step with modified independent using rail.       Start:  03/21/25    Expected End:  04/25/25            Patient will achieve bilateral knee ROM of  0-110 degrees        Start:  03/21/25    Expected End:  04/25/25            Patient will achieve bilateral hip flexion strength of at least 4+/5       Start:  03/21/25    Expected End:  04/25/25            Patient will achieve bilateral hip abduction strength of at least 4+/5       Start:  03/21/25    Expected End:  04/25/25            Patient will achieve bilateral knee flexion strength of at least 4+/5       Start:  03/21/25    Expected End:  04/25/25            Patient will achieve bilateral knee extension strength of at least 4+/5       Start:  03/21/25    Expected End:  04/25/25            Patient will demonstrate independence in home program for support  of progression       Start:  03/21/25    Expected End:  04/25/25            Patient will report pain of no more than 2/10 demonstrating a reduction of overall pain       Start:  03/21/25    Expected End:  04/25/25            Patient will show a significant change in LEFS (5 to 14) patient reported outcome tool to demonstrate subjective imporovement       Start:  03/21/25    Expected End:  04/25/25                 Mary Kate Carlson, PTA

## 2025-04-28 ENCOUNTER — DOCUMENTATION (OUTPATIENT)
Dept: PHYSICAL THERAPY | Facility: HOSPITAL | Age: 40
End: 2025-04-28
Payer: COMMERCIAL

## 2025-04-28 NOTE — PROGRESS NOTES
"Physical Therapy                 Therapy Communication Note    Patient Name: Leann Petersen \"Charmaine\"  MRN: [id number]  Today's Date: 4/28/2025     Discipline: Physical Therapy    Missed Time: No Showed for today's appointment.    "

## 2025-05-13 DIAGNOSIS — N63.0 MASS OF BREAST, UNSPECIFIED LATERALITY: ICD-10-CM

## 2025-05-16 ENCOUNTER — HOSPITAL ENCOUNTER (OUTPATIENT)
Dept: RADIOLOGY | Facility: CLINIC | Age: 40
Discharge: HOME | End: 2025-05-16
Payer: COMMERCIAL

## 2025-05-16 DIAGNOSIS — N63.0 MASS OF BREAST, UNSPECIFIED LATERALITY: ICD-10-CM

## 2025-05-16 PROCEDURE — 76642 ULTRASOUND BREAST LIMITED: CPT | Mod: RT

## 2025-05-16 PROCEDURE — 77062 BREAST TOMOSYNTHESIS BI: CPT

## 2025-05-16 PROCEDURE — 76982 USE 1ST TARGET LESION: CPT

## 2025-05-18 DIAGNOSIS — J45.909 MODERATE ASTHMA, UNSPECIFIED WHETHER COMPLICATED, UNSPECIFIED WHETHER PERSISTENT (HHS-HCC): ICD-10-CM

## 2025-05-18 DIAGNOSIS — E55.9 VITAMIN D DEFICIENCY: ICD-10-CM

## 2025-05-19 RX ORDER — ERGOCALCIFEROL 1.25 MG/1
1.25 CAPSULE ORAL
Qty: 12 CAPSULE | Refills: 0 | Status: SHIPPED | OUTPATIENT
Start: 2025-05-25

## 2025-05-19 RX ORDER — ALBUTEROL SULFATE 90 UG/1
2 INHALANT RESPIRATORY (INHALATION) EVERY 4 HOURS PRN
Qty: 8.5 G | Refills: 0 | Status: SHIPPED | OUTPATIENT
Start: 2025-05-19

## 2025-05-21 ENCOUNTER — APPOINTMENT (OUTPATIENT)
Dept: SLEEP MEDICINE | Facility: CLINIC | Age: 40
End: 2025-05-21
Payer: COMMERCIAL

## 2025-06-06 NOTE — PROGRESS NOTES
"Subjective   Patient ID: Leann Petersen \"Charmaine\" is a 40 y.o. female who presents for No chief complaint on file..  HPI  Current Medications[1]  Leann Petersen \"Charmaine\" is a 39 y.o. female who presents for Weight Loss.     HPI     Charmaine is here to establish care. She is tearful upon entering the room.      Weight gain/Obesity BMI 68.7- Eats once or twice a day. She started food modifications about 2 weeks ago. She was drinking a lot of soda, she is not drinking water. She works at [employer] and [employer] and this is usually where she gets her meals from.  She has 5 children, her  baseline was 160 lb.      ADÁN-Can't sleep, wakes up with headaches, nods off during the day. She has issues with her c-pap feels like she can't breath with it on. SOB while talking, SpO2 noted to be 90%     Hx of asthma- she is not using an inhaler, she is short of breath with talking, she states she gets short of breath when doing small task, she gets chest pain and tightness like someone is squeezing her.      Low back pain- Has severe low back pain in the morning, has a hard time being on her feet all day.      Flat arches- Following with podiatry, they want to reconstruct feet.      IUD- Mirana, she is not sexually active at this time.  Review of Systems    There were no vitals taken for this visit.     [unfilled]    Objective         Problem List Items Addressed This Visit    None    Physical Exam        Gen: No acute distress, alert and oriented x3, pleasant   HEENT: moist mucous membranes, b/l external auditory canals are clear of debris, TMs within normal limits, no oropharyngeal lesions, eomi, perrla   Neck: thyroid within normal limits, no lymphadenopathy   CV: RRR, normal S1/S2, no murmur   Resp: Clear to auscultation bilaterally, no wheezes or rhonchi appreciated  Abd: soft, nontender, non-distended, no guarding/rigidity, bowel sounds present  Extr: no edema, no calf tenderness  Derm: Skin is warm and dry, no rashes " appreciated  Psych: mood is good, affect is congruent, good hygiene, normal speech and eye contact  Neuro: cranial nerves grossly intact, normal gait      Assessment/Plan   {Assess/PlanSmartLinks:59573}  BMI 68.7  Orders:    Hemoglobin A1c; Future     Encounter for vitamin deficiency screening  Orders:    Vitamin D 25-Hydroxy,Total (for eval of Vitamin D levels); Future    Vitamin B12; Future     Screening for blood or protein in urine  BMI 68.7  Orders:    Urinalysis with Reflex Microscopic; Future     Screening for lipid disorders  BMI 68.7  Orders:    Lipid panel; Future     Screening for thyroid disorder  Hx of abnormal TSH levels  Orders:    Tsh With Reflex To Free T4 If Abnormal; Future    T3; Future    Thyroid Peroxidase (TPO) Antibody; Future     Screening for metabolic disorder  Orders:    Comprehensive metabolic panel; Future     Screening for blood disease  Orders:    CBC and Auto Differential; Future     Weight gain  Orders:    T3; Future    Thyroid Peroxidase (TPO) Antibody; Future  Expresses wanting to lose weight  Obstructive sleep apnea, adult  Wear CPAP  Referral back to sleep medicine to help adjust CPAP  Weight loss     Secondary hypertension  BLE edema  -Start hydrochlorothiazide  -Follow up 6 weeks for bp check     Moderate asthma, unspecified whether complicated, unspecified whether persistent ([facility])  Start albuterol and symbicort  Discussed weight loss     Morbid obesity with BMI of 60.0-69.9, adult (Multi)  -Continue increasing water  -Start cuting out fast food and incorporate protein and vegetables  -Start a food journal  -Will discuss weight loss options at follow up with labs.   -Ready to start making lifestyle changes        #HCM  DM, Thyroid, Lipid Screening- ordered  Pap- 2019 due       [1]   Current Outpatient Medications:     albuterol 90 mcg/actuation inhaler, INHALE 2 PUFFS BY MOUTH EVERY 4 HOURS AS NEEDED FOR WHEEZING OR SHORTNESS OF BREATH, Disp: 8.5 g, Rfl: 0     budesonide-formoteroL (Symbicort) 160-4.5 mcg/actuation inhaler, Inhale 2 puffs 2 times a day. Rinse mouth with water after use to reduce aftertaste and incidence of candidiasis. Do not swallow., Disp: 10.2 g, Rfl: 11    ergocalciferol (Vitamin D-2) 1250 mcg (50,000 units) capsule, TAKE 1 CAPSULE BY MOUTH 1 TIME EVERY WEEK, Disp: 12 capsule, Rfl: 0    hydroCHLOROthiazide (Microzide) 12.5 mg tablet, TAKE 1 TABLET(12.5 MG) BY MOUTH DAILY, Disp: 30 tablet, Rfl: 0  No current facility-administered medications for this visit.

## 2025-06-09 ENCOUNTER — APPOINTMENT (OUTPATIENT)
Dept: PRIMARY CARE | Facility: CLINIC | Age: 40
End: 2025-06-09
Payer: COMMERCIAL

## 2025-06-09 ENCOUNTER — PATIENT OUTREACH (OUTPATIENT)
Dept: PRIMARY CARE | Facility: CLINIC | Age: 40
End: 2025-06-09

## 2025-06-09 NOTE — PROGRESS NOTES
Discharge Facility: [facility]  Discharge Diagnosis: Acute respiratory failure with hypoxia and hypercarbia   Admission Date: 5/28/25  Discharge Date: 6/5/25    PCP Appointment Date: 6/9/25 @ 1230  Specialist Appointment Date: 7/21/25- [facility] Encounter and Summary Linked: Hospital Encounter with Lobito Saini DO; Lynnette Diop MD; Georgiana Guerrero MD; Isaiah Sanders MD   See discharge assessment below for further details    Two attempts were made to reach patient within two business days after discharge. Left voicemail with contact information for patient to call back with any non-emergent questions or concerns.

## 2025-06-10 NOTE — PROGRESS NOTES
"Subjective   Patient ID: Leann Petersen \"Jairo" is a 40 y.o. female who presents for Hospital Follow-up (Resp failure,chest pains, and SOB) and Cough.    HPI    Leann is here for hospital follow up.   States she went to the ED for shortness of breath, when she was in the MRI machine she passed out, she states she woke up in ICU with a tube in her throat.   Discharged on Prednisone, Coreg, and increased hydrochlorothiazide to 25 mg.     She is scared, she scheduled her sleep study and has a follow up scheduled with the pulmonologist. States she lost her weight while she was at the hospital states it was all water weight.     Initial appointment was for weight loss. We attempted to order a GLP1 which insurance did not approve.     ADÁN-Can't sleep, wakes up with headaches, nods off during the day. She has issues with her c-pap feels like she can't breath with it on. SOB while talking, SpO2 noted to be 90%    Hx of asthma-She has been better about using her albuterol and Symbicort. She is still getting chest pain and tightness like someone is squeezing her.        Current Medications[1]        Objective   /72 (BP Location: Right arm, Patient Position: Sitting, BP Cuff Size: Large adult)   Pulse (!) 116   Ht 1.549 m (5' 1\")   Wt (!) 156 kg (344 lb 6.4 oz)   BMI 65.07 kg/m²       Problem List Items Addressed This Visit       Leg swelling    Relevant Medications    hydroCHLOROthiazide (Microzide) 12.5 mg tablet    Mild intermittent asthma without complication (HHS-HCC)    Relevant Medications    tirzepatide, weight loss, (Zepbound) 2.5 mg/0.5 mL injection    Secondary hypertension    Relevant Medications    hydroCHLOROthiazide (Microzide) 12.5 mg tablet     Other Visit Diagnoses         Elevated hemoglobin A1c    -  Primary    Relevant Medications    tirzepatide, weight loss, (Zepbound) 2.5 mg/0.5 mL injection      Hospital discharge follow-up          ADÁN (obstructive sleep apnea)        Relevant Medications "    tirzepatide, weight loss, (Zepbound) 2.5 mg/0.5 mL injection      History of respiratory failure        Relevant Medications    tirzepatide, weight loss, (Zepbound) 2.5 mg/0.5 mL injection          Physical Exam    Gen: No acute distress, alert and oriented x3, pleasant   HEENT: moist mucous membranes, b/l external auditory canals are clear of debris, TMs within normal limits, no oropharyngeal lesions, eomi, perrla   Neck: thyroid within normal limits, no lymphadenopathy   CV: RRR, normal S1/S2, no murmur   Resp: On O2 via NC, wheezing  Abd: soft, nontender, non-distended, no guarding/rigidity, bowel sounds present  Extr: no edema, no calf tenderness  Derm: Skin is warm and dry, no rashes appreciated  Psych: mood is good, affect is congruent, good hygiene, normal speech and eye contact  Neuro: cranial nerves grossly intact, normal gait      Assessment/Plan   Diagnoses and all orders for this visit:    Hospital discharge follow-up    Mild intermittent asthma without complication (HHS-HCC)  History of respiratory failure  -     tirzepatide, weight loss, (Zepbound) 2.5 mg/0.5 mL injection; Inject 2.5 mg under the skin every 7 days.    Elevated hemoglobin A1c  Obesity BMI 65.07  ADÁN (obstructive sleep apnea)  -     tirzepatide, weight loss, (Zepbound) 2.5 mg/0.5 mL injection; Inject 2.5 mg under the skin every 7 days.    Leg swelling  -     hydroCHLOROthiazide (Microzide) 12.5 mg tablet; Take 2 tablets (25 mg) by mouth once daily.    Secondary hypertension  -     hydroCHLOROthiazide (Microzide) 12.5 mg tablet; Take 2 tablets (25 mg) by mouth once daily.       #HCM  DM, Thyroid, Lipid Screening- ordered  Pap- 2019 due         [1]   Current Outpatient Medications:     albuterol 90 mcg/actuation inhaler, INHALE 2 PUFFS BY MOUTH EVERY 4 HOURS AS NEEDED FOR WHEEZING OR SHORTNESS OF BREATH, Disp: 8.5 g, Rfl: 0    budesonide-formoteroL (Symbicort) 160-4.5 mcg/actuation inhaler, Inhale 2 puffs 2 times a day. Rinse mouth with  water after use to reduce aftertaste and incidence of candidiasis. Do not swallow., Disp: 10.2 g, Rfl: 11    ergocalciferol (Vitamin D-2) 1250 mcg (50,000 units) capsule, TAKE 1 CAPSULE BY MOUTH 1 TIME EVERY WEEK, Disp: 12 capsule, Rfl: 0    hydroCHLOROthiazide (Microzide) 12.5 mg tablet, Take 2 tablets (25 mg) by mouth once daily., Disp: 180 tablet, Rfl: 3    tirzepatide, weight loss, (Zepbound) 2.5 mg/0.5 mL injection, Inject 2.5 mg under the skin every 7 days., Disp: 2 mL, Rfl: 3

## 2025-06-12 ENCOUNTER — PATIENT OUTREACH (OUTPATIENT)
Dept: PRIMARY CARE | Facility: CLINIC | Age: 40
End: 2025-06-12
Payer: COMMERCIAL

## 2025-06-12 NOTE — PROGRESS NOTES
Unable to reach patient for follow up call after recent hospitalization.   Left voicemail with call back number for patient to call if needed  to assist with any questions or concerns patient may have.  Pt. Has not had follow up with PCP post hospital discharge as of this note

## 2025-06-13 ENCOUNTER — OFFICE VISIT (OUTPATIENT)
Dept: PRIMARY CARE | Facility: CLINIC | Age: 40
End: 2025-06-13
Payer: COMMERCIAL

## 2025-06-13 VITALS
BODY MASS INDEX: 55.32 KG/M2 | WEIGHT: 293 LBS | HEIGHT: 61 IN | DIASTOLIC BLOOD PRESSURE: 72 MMHG | HEART RATE: 116 BPM | SYSTOLIC BLOOD PRESSURE: 104 MMHG

## 2025-06-13 DIAGNOSIS — J45.20 MILD INTERMITTENT ASTHMA WITHOUT COMPLICATION (HHS-HCC): ICD-10-CM

## 2025-06-13 DIAGNOSIS — Z87.09 HISTORY OF RESPIRATORY FAILURE: ICD-10-CM

## 2025-06-13 DIAGNOSIS — R73.09 ELEVATED HEMOGLOBIN A1C: Primary | ICD-10-CM

## 2025-06-13 DIAGNOSIS — Z09 HOSPITAL DISCHARGE FOLLOW-UP: ICD-10-CM

## 2025-06-13 DIAGNOSIS — G47.33 OSA (OBSTRUCTIVE SLEEP APNEA): ICD-10-CM

## 2025-06-13 DIAGNOSIS — E66.01 MORBID OBESITY WITH BMI OF 60.0-69.9, ADULT (MULTI): ICD-10-CM

## 2025-06-13 DIAGNOSIS — I15.9 SECONDARY HYPERTENSION: ICD-10-CM

## 2025-06-13 DIAGNOSIS — M79.89 LEG SWELLING: ICD-10-CM

## 2025-06-13 RX ORDER — HYDROCHLOROTHIAZIDE 12.5 MG/1
25 TABLET ORAL DAILY
Qty: 180 TABLET | Refills: 3 | Status: SHIPPED | OUTPATIENT
Start: 2025-06-13 | End: 2026-06-13

## 2025-06-13 ASSESSMENT — PATIENT HEALTH QUESTIONNAIRE - PHQ9
1. LITTLE INTEREST OR PLEASURE IN DOING THINGS: NOT AT ALL
2. FEELING DOWN, DEPRESSED OR HOPELESS: NOT AT ALL
SUM OF ALL RESPONSES TO PHQ9 QUESTIONS 1 AND 2: 0

## 2025-06-13 NOTE — PATIENT INSTRUCTIONS
[facility]:  Schedule an appointment for labs at [facility]/patient or call [phone] 24 hours a day, 7 days a week.   You can also download the [facility] lab scheduling [place] on your phone.     Radiology [phone]    Cardiology testing (ECHO, Stress Test, ZioPatch):  [phone]    Pulmonary Function Test:  [phone]

## 2025-06-23 ENCOUNTER — APPOINTMENT (OUTPATIENT)
Dept: PODIATRY | Facility: CLINIC | Age: 40
End: 2025-06-23
Payer: COMMERCIAL

## 2025-06-23 DIAGNOSIS — M79.672 PAIN IN BOTH FEET: ICD-10-CM

## 2025-06-23 DIAGNOSIS — M79.671 PAIN IN BOTH FEET: ICD-10-CM

## 2025-07-09 ENCOUNTER — OFFICE VISIT (OUTPATIENT)
Dept: PODIATRY | Facility: CLINIC | Age: 40
End: 2025-07-09
Payer: COMMERCIAL

## 2025-07-09 ENCOUNTER — HOSPITAL ENCOUNTER (OUTPATIENT)
Dept: RADIOLOGY | Facility: CLINIC | Age: 40
Discharge: HOME | End: 2025-07-09
Payer: COMMERCIAL

## 2025-07-09 DIAGNOSIS — M79.671 PAIN IN BOTH FEET: ICD-10-CM

## 2025-07-09 DIAGNOSIS — M19.072 DEGENERATIVE JOINT DISEASE OF BOTH ANKLES AND FEET: ICD-10-CM

## 2025-07-09 DIAGNOSIS — M21.42 BILATERAL PES PLANUS: ICD-10-CM

## 2025-07-09 DIAGNOSIS — M79.672 PAIN IN BOTH FEET: ICD-10-CM

## 2025-07-09 DIAGNOSIS — M19.071 DEGENERATIVE JOINT DISEASE OF BOTH ANKLES AND FEET: ICD-10-CM

## 2025-07-09 DIAGNOSIS — M72.2 PLANTAR FASCIITIS: ICD-10-CM

## 2025-07-09 DIAGNOSIS — R26.89 ANTALGIC GAIT: Primary | ICD-10-CM

## 2025-07-09 DIAGNOSIS — E66.01 MORBID OBESITY WITH BMI OF 60.0-69.9, ADULT (MULTI): ICD-10-CM

## 2025-07-09 DIAGNOSIS — M21.41 BILATERAL PES PLANUS: ICD-10-CM

## 2025-07-09 PROCEDURE — 73630 X-RAY EXAM OF FOOT: CPT | Mod: BILATERAL PROCEDURE | Performed by: STUDENT IN AN ORGANIZED HEALTH CARE EDUCATION/TRAINING PROGRAM

## 2025-07-09 PROCEDURE — 99204 OFFICE O/P NEW MOD 45 MIN: CPT | Performed by: PODIATRIST

## 2025-07-09 PROCEDURE — 99214 OFFICE O/P EST MOD 30 MIN: CPT | Performed by: PODIATRIST

## 2025-07-09 PROCEDURE — 1036F TOBACCO NON-USER: CPT | Performed by: PODIATRIST

## 2025-07-09 PROCEDURE — 73630 X-RAY EXAM OF FOOT: CPT | Mod: 50

## 2025-07-09 RX ORDER — CARVEDILOL 3.12 MG/1
3.12 TABLET ORAL
COMMUNITY

## 2025-07-09 RX ORDER — ALBUTEROL SULFATE 0.83 MG/ML
SOLUTION RESPIRATORY (INHALATION)
COMMUNITY
Start: 2025-06-26

## 2025-07-09 RX ORDER — PREDNISONE 20 MG/1
TABLET ORAL
COMMUNITY
Start: 2025-06-05

## 2025-07-09 RX ORDER — HYDROCHLOROTHIAZIDE 25 MG/1
1 TABLET ORAL
COMMUNITY
Start: 2025-06-05

## 2025-07-09 NOTE — PROGRESS NOTES
Chief Complaint   Patient presents with    Foot Problem     CANNOT STAND MORE THAN 10 MIN WITHOUT PAIN AND SWELLING       Painful plantar feet; duration years 2-4 years; progressive pain.  After just a couple hours painful. No specific swelling.  Has had orthotics made in the past, cortisone injections.   Recently went to hospital: Acute Hypoxic and Hypercapnic Respiratory Failure due to Asthma w/ Acute Exacerbation and OHS w/ exacerbation.  + CPAP.  + HTN, edema.  Morbidly obese.  Recently started weight loss medication while she was in the hospital.  . Wears Crocs at work.  Has to go back to work yet since her hospitalization    General/Constitutional: Alert. NAD.  Morbidly obese.  Respiratory: Non labored breathing.   Psychiatric: Mood and affect normal/baseline.   Dermatologic: Skin and nails intact. No acute inflammatory infectious process. Web spaces are dry. No ulcers no pre-ulcerative areas.  Vascular: Pedal pulses are intact and symmetric including the dorsalis pedis and the posterior tibial pulses. Feet are warm to touch.  Mild generalized swelling both lower extremities.    Neurological: Alert and oriented. No acute distress. No sensory impairment bilateral.  Musculoskeletal: Strength is normal for age. No acute deficits appreciated.  Antalgic gait appreciated.  Pain on palpation plantar mid arch of both feet.  No pain with range of motion.  No Achilles insertional pain.  No heel pain.  No acute swelling.  No ecchymosis.  Pes planus deformity bilaterally.  Mild pain over the tarsal metatarsal area of both feet.  No gross deformities noted.  Review of x-rays DJD at the talonavicular joints.    Impression: Morbidly obese female with pes planus deformity DJD and bilateral foot pain.    - Today's treatment and course of therapy was discussed with the patient in detail. Patient's questions were answered. Proper foot care was discussed. This dictation was done using Dragon computer software and  as such may contain grammatical errors.    -Will start with physical therapy.    -Also recommend more supportive shoe gear other than crocs or sketchers.  I would recommend Hoka's or new balance or similar further support laces are also beneficial.    -Will order new x-rays today.    -Reviewed previous x-rays.  Positive talonavicular DJD.    -Continue with your weight loss efforts.    -Recommend over-the-counter ibuprofen follow label instructions 2 pills 3 times daily.

## 2025-07-28 ENCOUNTER — APPOINTMENT (OUTPATIENT)
Dept: SLEEP MEDICINE | Facility: CLINIC | Age: 40
End: 2025-07-28
Payer: COMMERCIAL

## 2025-07-30 ENCOUNTER — OFFICE VISIT (OUTPATIENT)
Dept: PODIATRY | Facility: CLINIC | Age: 40
End: 2025-07-30
Payer: COMMERCIAL

## 2025-07-30 DIAGNOSIS — M72.2 PLANTAR FASCIITIS: ICD-10-CM

## 2025-07-30 DIAGNOSIS — R26.89 ANTALGIC GAIT: ICD-10-CM

## 2025-07-30 DIAGNOSIS — M21.42 BILATERAL PES PLANUS: ICD-10-CM

## 2025-07-30 DIAGNOSIS — M21.41 BILATERAL PES PLANUS: ICD-10-CM

## 2025-07-30 DIAGNOSIS — E66.01 MORBID OBESITY WITH BMI OF 60.0-69.9, ADULT (MULTI): ICD-10-CM

## 2025-07-30 DIAGNOSIS — M79.671 PAIN IN BOTH FEET: Primary | ICD-10-CM

## 2025-07-30 DIAGNOSIS — M19.072 DEGENERATIVE JOINT DISEASE OF BOTH ANKLES AND FEET: ICD-10-CM

## 2025-07-30 DIAGNOSIS — M19.071 DEGENERATIVE JOINT DISEASE OF BOTH ANKLES AND FEET: ICD-10-CM

## 2025-07-30 DIAGNOSIS — M79.672 PAIN IN BOTH FEET: Primary | ICD-10-CM

## 2025-07-30 PROCEDURE — 99214 OFFICE O/P EST MOD 30 MIN: CPT | Performed by: PODIATRIST

## 2025-07-30 PROCEDURE — 1036F TOBACCO NON-USER: CPT | Performed by: PODIATRIST

## 2025-07-30 RX ORDER — METHYLPREDNISOLONE 4 MG/1
TABLET ORAL
Qty: 21 TABLET | Refills: 0 | Status: SHIPPED | OUTPATIENT
Start: 2025-07-30 | End: 2025-08-05

## 2025-07-30 NOTE — PROGRESS NOTES
Chief Complaint   Patient presents with    Foot Pain     BILATERAL FEET PAIN     Chief Complaint   Patient presents with    Foot Pain     BILATERAL FEET PAIN     Starting tomorrow with PT (both foot and back).  Has new balance shoe; no changes yet. Feet and legs swelling.  She does take a diuretic from primary care regarding this.   Did not take any ibuprofen.    Painful plantar feet; duration years 2-4 years; progressive pain.  After just a couple hours painful. No specific swelling.  Has had orthotics made in the past, cortisone injections.   Recently went to hospital: Acute Hypoxic and Hypercapnic Respiratory Failure due to Asthma w/ Acute Exacerbation and OHS w/ exacerbation.  + CPAP.  + HTN, edema.  Morbidly obese.  Recently started weight loss medication while she was in the hospital.  . Wears Crocs at work.  Has to go back to work yet since her hospitalization    General/Constitutional: Alert. NAD.  Morbidly obese.  Respiratory: Non labored breathing.   Psychiatric: Mood and affect normal/baseline.   Dermatologic: Skin and nails intact. No acute inflammatory infectious process. Web spaces are dry. No ulcers no pre-ulcerative areas.  Vascular: Pedal pulses are intact and symmetric including the dorsalis pedis and the posterior tibial pulses. Feet are warm to touch.  Mild generalized swelling both lower extremities.    Neurological: Alert and oriented. No acute distress. No sensory impairment bilateral.  Musculoskeletal: Strength is normal for age. No acute deficits appreciated.  Antalgic gait appreciated.  Otherwise no appreciable changes at this time.  This include pain on palpation plantar mid arch of both feet.  No pain with range of motion.  No Achilles insertional pain.  No heel pain.  No acute swelling.  No ecchymosis.  Pes planus deformity bilaterally.  Mild pain over the tarsal metatarsal area of both feet.  No gross deformities noted.  Review of x-rays DJD at the talonavicular  joints.    Impression: Morbidly obese female with pes planus deformity DJD and bilateral foot pain.    - Today's treatment and course of therapy was discussed with the patient in detail. Patient's questions were answered. Proper foot care was discussed. This dictation was done using Dragon computer software and as such may contain grammatical errors.    - So start your physical therapy.  Follow their instructions    -Continue with your diuretic.  Weight loss efforts.  I would recommend continue with your new shoes as tolerated.      - Reviewed x-rays again.    - Order Medrol Dosepak.  Follow label instructions.    -Limit your salt intake.  Elevation of the feet encouraged.  Compression stockings also be helpful.    -No interval labs or notes reviewed

## 2025-08-04 ENCOUNTER — DOCUMENTATION (OUTPATIENT)
Dept: PHYSICAL THERAPY | Facility: HOSPITAL | Age: 40
End: 2025-08-04
Payer: COMMERCIAL

## 2025-08-04 NOTE — PROGRESS NOTES
"Physical Therapy                 Therapy Communication Note    Patient Name: Leann Petersen \"Charmaine\"  MRN: [id number]  Today's Date: 8/4/2025     Discipline: Physical Therapy    Missed Time: No Showed for today's evaluation.      "

## 2025-08-04 NOTE — PROGRESS NOTES
"Physical Therapy    Discharge Summary    Name: Leann Petersen \"Jairo"  MRN: [id number]  : 1985  Date: 27      Discharge Summary: PT    Discharge Information: Date of discharge 2025, Date of last visit 2025, Date of evaluation 3/21/2025, Number of attended visits 3, Referred by Dr. Jefferson, and Referred for bilateral knee pain    Therapy Summary:  Patient is a 39 year old female who presents to clinic with complaints of bilateral knee pain. Patient has had issues with bilateral knee pain for 2-3 years and her pain has become more constant over the past year. Patient has a prior medical history including: morbid obesity, anemia, flat feet, peroneal tendon injury, ankle sprain, bilateral knee pain, moderate asthma. Patient states that she has shoe inserts for her flat feet, but she does not wear them.     Discharge Status: Pt. Able to tolerate all exercises, but required rest breaks due to pain and fatigue as well as SOB. Will advance as tolerated.      Rehab Discharge Reason: Failed to schedule and/or keep follow-up appointment(s)  "

## 2025-08-12 ENCOUNTER — APPOINTMENT (OUTPATIENT)
Dept: PODIATRY | Facility: CLINIC | Age: 40
End: 2025-08-12
Payer: COMMERCIAL

## 2025-08-18 ENCOUNTER — APPOINTMENT (OUTPATIENT)
Dept: RADIOLOGY | Facility: CLINIC | Age: 40
End: 2025-08-18
Payer: COMMERCIAL

## 2025-09-10 ENCOUNTER — APPOINTMENT (OUTPATIENT)
Dept: ORTHOPEDIC SURGERY | Facility: CLINIC | Age: 40
End: 2025-09-10
Payer: COMMERCIAL

## 2025-09-18 ENCOUNTER — APPOINTMENT (OUTPATIENT)
Dept: ORTHOPEDIC SURGERY | Facility: CLINIC | Age: 40
End: 2025-09-18
Payer: COMMERCIAL